# Patient Record
Sex: MALE | Race: WHITE | NOT HISPANIC OR LATINO | URBAN - METROPOLITAN AREA
[De-identification: names, ages, dates, MRNs, and addresses within clinical notes are randomized per-mention and may not be internally consistent; named-entity substitution may affect disease eponyms.]

---

## 2017-01-13 ENCOUNTER — IMPORTED ENCOUNTER (OUTPATIENT)
Dept: URBAN - METROPOLITAN AREA CLINIC 1 | Facility: CLINIC | Age: 72
End: 2017-01-13

## 2017-01-13 PROBLEM — H01.002: Noted: 2017-01-13

## 2017-01-13 PROBLEM — H16.141: Noted: 2017-01-13

## 2017-01-13 PROBLEM — Z96.1: Noted: 2017-01-13

## 2017-01-13 PROBLEM — H01.005: Noted: 2017-01-13

## 2017-01-13 PROBLEM — H35.3112: Noted: 2017-01-13

## 2017-01-13 PROBLEM — H40.051: Noted: 2017-01-13

## 2017-01-13 PROBLEM — H26.491: Noted: 2017-01-13

## 2017-01-13 PROBLEM — H04.121: Noted: 2017-01-13

## 2017-01-13 PROCEDURE — 92012 INTRM OPH EXAM EST PATIENT: CPT

## 2017-01-13 PROCEDURE — 92133 CPTRZD OPH DX IMG PST SGM ON: CPT

## 2017-01-13 NOTE — PATIENT DISCUSSION
1.  OHTN OD (0.35)- Slight increased IOP OD w/ stated compliance. OCT w/ relative decreased RNF but remains in normal limits. Observe for changes/progression. Patient to continue with Latanoprost OD QHS and Betoptic OD BID2. ARMD OD intermediate/dry/stable. Importance of daily AREDS II study multivitamin and Amsler Grid checks discussed with patient. Patient to follow-up immediately with any new onset of decreased vision and/or metamorphopsia. 3. ALAN w/ PEK OD- The continuation of artificial tears were recommended. 4.  PCO OD- Observe and consider yag cap when pt feels pco visually significant and visual acuity decreases to appropriate level. 5. Blepharitis posterior type OU- Daily warm compresses and lid scrubs were recommended. 6. Pseudophakia OD (Toric)- Doing well. 7.  Anophthalmos OS- monocular safety precautions. 8. Return for an appointment for a 30 in 6 months with Dr. Shana Chadwick.

## 2017-07-14 ENCOUNTER — IMPORTED ENCOUNTER (OUTPATIENT)
Dept: URBAN - METROPOLITAN AREA CLINIC 1 | Facility: CLINIC | Age: 72
End: 2017-07-14

## 2017-07-14 PROBLEM — H16.141: Noted: 2017-07-14

## 2017-07-14 PROBLEM — H35.3111: Noted: 2017-07-14

## 2017-07-14 PROBLEM — H26.491: Noted: 2017-07-14

## 2017-07-14 PROBLEM — H04.121: Noted: 2017-07-14

## 2017-07-14 PROBLEM — Z96.1: Noted: 2017-07-14

## 2017-07-14 PROBLEM — H40.051: Noted: 2017-07-14

## 2017-07-14 PROCEDURE — 92014 COMPRE OPH EXAM EST PT 1/>: CPT

## 2017-07-14 NOTE — PATIENT DISCUSSION
1.  OHTN OD (0.35)- IOP stable on current meds. Patient to continue with Latanoprost OD QHS and Betoptic OD BID2. ARMD OD Intermediate/dry/stable. Importance of daily AREDS II study multivitamin and Amsler Grid checks discussed with patient. Patient to follow-up immediately with any new onset of decreased vision and/or metamorphopsia. 3. ALAN w/ PEK OD- Cont ATs BID OU routinely. 4.  PCO OD- Observe 5. Blepharitis posterior type OU- Continue Daily warm compresses and lid scrubs were recommended. 6. Pseudophakia OD (Toric)- Doing well. 7.  Anophthalmos OS- monocular safety precautions. Return for an appointment in 6 mo 10 dfe OCT with Dr. Gabriela Lovell.

## 2022-04-02 ASSESSMENT — VISUAL ACUITY
OD_CC: 20/20
OD_CC: 20/20-2

## 2022-04-02 ASSESSMENT — TONOMETRY
OD_IOP_MMHG: 20
OD_IOP_MMHG: 20